# Patient Record
Sex: FEMALE | Race: WHITE | NOT HISPANIC OR LATINO
[De-identification: names, ages, dates, MRNs, and addresses within clinical notes are randomized per-mention and may not be internally consistent; named-entity substitution may affect disease eponyms.]

---

## 2019-01-15 PROBLEM — Z00.00 ENCOUNTER FOR PREVENTIVE HEALTH EXAMINATION: Status: ACTIVE | Noted: 2019-01-15

## 2019-01-24 ENCOUNTER — APPOINTMENT (OUTPATIENT)
Dept: PLASTIC SURGERY | Facility: CLINIC | Age: 39
End: 2019-01-24
Payer: COMMERCIAL

## 2019-01-24 VITALS
HEART RATE: 71 BPM | HEIGHT: 67 IN | WEIGHT: 128 LBS | BODY MASS INDEX: 20.09 KG/M2 | SYSTOLIC BLOOD PRESSURE: 114 MMHG | DIASTOLIC BLOOD PRESSURE: 77 MMHG | TEMPERATURE: 97.7 F

## 2019-01-24 DIAGNOSIS — Z90.13 ACQUIRED ABSENCE OF BILATERAL BREASTS AND NIPPLES: ICD-10-CM

## 2019-01-24 DIAGNOSIS — Z15.09 GENETIC SUSCEPTIBILITY TO MALIGNANT NEOPLASM OF BREAST: ICD-10-CM

## 2019-01-24 DIAGNOSIS — Z15.01 GENETIC SUSCEPTIBILITY TO MALIGNANT NEOPLASM OF BREAST: ICD-10-CM

## 2019-01-24 DIAGNOSIS — Z80.3 FAMILY HISTORY OF MALIGNANT NEOPLASM OF BREAST: ICD-10-CM

## 2019-01-24 PROCEDURE — 99205 OFFICE O/P NEW HI 60 MIN: CPT

## 2019-01-24 RX ORDER — MULTIVITAMIN
TABLET ORAL
Refills: 0 | Status: ACTIVE | COMMUNITY

## 2019-01-24 RX ORDER — ASCORBIC ACID 500 MG
TABLET ORAL
Refills: 0 | Status: ACTIVE | COMMUNITY

## 2019-01-25 PROBLEM — Z90.13 ABSENCE OF BOTH BREASTS: Status: ACTIVE | Noted: 2019-01-25

## 2019-01-25 PROBLEM — Z15.01 BRCA GENE MUTATION POSITIVE: Status: ACTIVE | Noted: 2019-01-24

## 2019-01-25 NOTE — REASON FOR VISIT
[Consultation] : a consultation visit [FreeTextEntry1] : patient presents for a breast reconstruction revision consultation.

## 2019-01-25 NOTE — HISTORY OF PRESENT ILLNESS
[FreeTextEntry1] : 39 yo female presents with  for breast reconstruction revision consultation.  Patient is BRCA positive and underwent bilateral skin sparing mastectomy and bilateral breast reconstruction with tissue expanders, followed by placement of subpectoral smooth, round, silicone implants in .  The patient then underwent bilateral nipple and areola reconstruction followed by nipple/areola tattoo.  \par \par The patient presents today complaining of animation deformity, hardness, and obliteration of IMF.  She is happy with her size/volume and denies rippling.  She is pleased with her nipple reconstruction, but states her tattoo pigment has faded. \par \par  The patient is BRCA positive, denies personal history of breast cancer, but has family history of breast cancer in her mother and maternal grandmother.  She had an MRI one week ago that she states was WNL.  She is overall healthy.  \par \par She has a history of inguinal hernia repair in .  She has 3 children, ages 7 years, 4 years, and 4 months, all .  She is allergic to amoxicillin.  She does not smoke.  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

## 2019-01-25 NOTE — PHYSICAL EXAM
[NI] : Normal [de-identified] : sternal notch to nipple on the left is 17.5 cm and 18 cm on the right, the nipple to IMF is 7 cm on the left and 6 cm on the right, the base width is 9.5 cm bilaterally.  There are 9 cm transverse mastectomy scars bilaterally.  The left IMF is higher than the right IMF.  There is bilateral nipple/areola reconstruction and bilateral tattoo.  There is visible animation deformity with pectoralis major contraction.  The patient has significant breast asymmetry.  She has upper pole fullness with lack of lower pole fullness.  [de-identified] : inadequate adiposity or skin excess for abdominal based free tissue transfer